# Patient Record
Sex: FEMALE | Race: WHITE | URBAN - METROPOLITAN AREA
[De-identification: names, ages, dates, MRNs, and addresses within clinical notes are randomized per-mention and may not be internally consistent; named-entity substitution may affect disease eponyms.]

---

## 2021-05-06 ENCOUNTER — OFFICE VISIT (OUTPATIENT)
Dept: URBAN - METROPOLITAN AREA TELEHEALTH 2 | Facility: TELEHEALTH | Age: 46
End: 2021-05-06
Payer: COMMERCIAL

## 2021-05-06 DIAGNOSIS — R19.7 ACUTE DIARRHEA: ICD-10-CM

## 2021-05-06 PROCEDURE — 97803 MED NUTRITION INDIV SUBSEQ: CPT | Performed by: DIETITIAN, REGISTERED

## 2021-06-07 ENCOUNTER — WEB ENCOUNTER (OUTPATIENT)
Dept: URBAN - METROPOLITAN AREA CLINIC 105 | Facility: CLINIC | Age: 46
End: 2021-06-07

## 2021-06-07 ENCOUNTER — OFFICE VISIT (OUTPATIENT)
Dept: URBAN - METROPOLITAN AREA CLINIC 105 | Facility: CLINIC | Age: 46
End: 2021-06-07
Payer: COMMERCIAL

## 2021-06-07 ENCOUNTER — LAB OUTSIDE AN ENCOUNTER (OUTPATIENT)
Dept: URBAN - METROPOLITAN AREA CLINIC 105 | Facility: CLINIC | Age: 46
End: 2021-06-07

## 2021-06-07 DIAGNOSIS — R15.9 FULL INCONTINENCE OF FECES: ICD-10-CM

## 2021-06-07 DIAGNOSIS — R19.4 CHANGE IN BOWEL HABIT: ICD-10-CM

## 2021-06-07 DIAGNOSIS — R15.2 FECAL URGENCY: ICD-10-CM

## 2021-06-07 PROCEDURE — 99213 OFFICE O/P EST LOW 20 MIN: CPT | Performed by: INTERNAL MEDICINE

## 2021-06-07 NOTE — HPI-TODAY'S VISIT:
43 yo lady pt of Dr Zina Delatorre. Gyn is Dr Avila Dalton. She has never had an eGD or colonoscopy. She says historically she has had very normal BMs. She usually has a BM every 2-3 days for most of her life until last year. Stools are usually "not too watery or too constipated". In the last year, stools have been softer, more mushy, pudding like in consistency.  She did not keep track too much, she does not keep track of fiber intake and just tries to eat healthy. More recently, she is now experiencing more urgency with a "few accidents". Once was in traffic in [place] and could not find a place to go quickly enough. She was anxious on Spring break because of this. Since covid pandemic she has been more mindful about what she eats. She has increased fiber intake with oats and soluble fiber and has had improvement. She has cut down on insoluble fiber - she is eating less whole wheat bread. She has felt better in last few days than the whole year. She is more concerned about urgency than consistency and sometimes will have incomplete evacuation. She mostly has one BM a day and then she feels like she has to go again later in about an hour. When she had accidents (2 in total) once she did not go in am, and the other time. "It is more the feeling of urgency". No nocturnal stools, no blood in stools, no weight loss, no fhx of colon cancer. On looking back, she thinks prior to being mindful, she has been eating about 10 g of fiber a day and since then now getting closer to 30 g.  5/7/20 Says "Arlen been doing  a bit better for sure". Discussed KUB results. Has increased metamucil - seen improvement. STarted off with one teaspoon, then 2 and now 3 per day. She has a BM daily for never feels completely evacuated. also discussed blood work  5/19/20 She says she has continued metamucil bid with 2 teaspoons in am and one teaspoon in pm. She has been doing 2 doses of miralax in the evenings. She has had some abd cramping and gas and bloating, and gurgling noises in her stomach. She had some small hard stools come out. More recently she is having loose stools or a very loose consistency not quite diarrhea.  6/7/21 STill having alternating constipation and diarrhea.  Did food sensitivity testing and is trying to avoid wheat to see if it helps.  She is drinking about 48 ounces of water a day.  She eats about 20-30 g of fiber a day. Had stopped metamucil recently so her fiber intake is lower.  NO working out recently due to injury in 8/2020. She used to work out daily.

## 2021-06-08 PROBLEM — 142251000119102: Status: ACTIVE | Noted: 2021-06-07

## 2021-06-15 ENCOUNTER — OFFICE VISIT (OUTPATIENT)
Dept: URBAN - METROPOLITAN AREA MEDICAL CENTER 28 | Facility: MEDICAL CENTER | Age: 46
End: 2021-06-15
Payer: COMMERCIAL

## 2021-06-15 DIAGNOSIS — K59.09 CHRONIC CONSTIPATION: ICD-10-CM

## 2021-06-15 PROCEDURE — 91122 ANAL PRESSURE RECORD: CPT | Performed by: INTERNAL MEDICINE

## 2021-06-15 PROCEDURE — 91120 RECTAL SENSATION TEST: CPT | Performed by: INTERNAL MEDICINE

## 2021-06-25 ENCOUNTER — TELEPHONE ENCOUNTER (OUTPATIENT)
Dept: URBAN - METROPOLITAN AREA CLINIC 105 | Facility: CLINIC | Age: 46
End: 2021-06-25

## 2021-07-12 ENCOUNTER — DASHBOARD ENCOUNTERS (OUTPATIENT)
Age: 46
End: 2021-07-12

## 2021-07-21 ENCOUNTER — OFFICE VISIT (OUTPATIENT)
Dept: URBAN - METROPOLITAN AREA CLINIC 105 | Facility: CLINIC | Age: 46
End: 2021-07-21